# Patient Record
Sex: FEMALE | Race: OTHER | HISPANIC OR LATINO
[De-identification: names, ages, dates, MRNs, and addresses within clinical notes are randomized per-mention and may not be internally consistent; named-entity substitution may affect disease eponyms.]

---

## 2021-04-22 PROBLEM — Z00.00 ENCOUNTER FOR PREVENTIVE HEALTH EXAMINATION: Status: ACTIVE | Noted: 2021-04-22

## 2021-04-23 ENCOUNTER — NON-APPOINTMENT (OUTPATIENT)
Age: 36
End: 2021-04-23

## 2021-04-23 ENCOUNTER — APPOINTMENT (OUTPATIENT)
Dept: ORTHOPEDIC SURGERY | Facility: CLINIC | Age: 36
End: 2021-04-23
Payer: COMMERCIAL

## 2021-04-23 DIAGNOSIS — M54.12 RADICULOPATHY, CERVICAL REGION: ICD-10-CM

## 2021-04-23 DIAGNOSIS — Z78.9 OTHER SPECIFIED HEALTH STATUS: ICD-10-CM

## 2021-04-23 DIAGNOSIS — Z83.438 FAMILY HISTORY OF OTHER DISORDER OF LIPOPROTEIN METABOLISM AND OTHER LIPIDEMIA: ICD-10-CM

## 2021-04-23 DIAGNOSIS — Z82.49 FAMILY HISTORY OF ISCHEMIC HEART DISEASE AND OTHER DISEASES OF THE CIRCULATORY SYSTEM: ICD-10-CM

## 2021-04-23 PROCEDURE — 99072 ADDL SUPL MATRL&STAF TM PHE: CPT

## 2021-04-23 PROCEDURE — 72100 X-RAY EXAM L-S SPINE 2/3 VWS: CPT

## 2021-04-23 PROCEDURE — 72040 X-RAY EXAM NECK SPINE 2-3 VW: CPT

## 2021-04-23 PROCEDURE — 99204 OFFICE O/P NEW MOD 45 MIN: CPT
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
## 2021-04-23 NOTE — PHYSICAL EXAM
[de-identified] : Constitutional: Well-nourished, well-developed, No acute distress\par Respiratory:  Good respiratory effort, no SOB\par Lymphatic: No regional lymphadenopathy, no lymphedema\par Psychiatric: Pleasant and normal affect, alert and oriented x3\par Musculoskeletal: normal except where as noted in regional exam\par \par Cervical Spine Exam\par APPEARANCE: no marked deformities or malalignment, normal curvature, good posture\par POSITIVE TENDERNESS: + Bilateral upper trapezius, levator scapula, rhomboid major, and rhomboid minor, + spasm noted in the same muscles.\par NONTENDER: no bony midline tenderness.\par ROM: limited in all planes, most notably in flexion and sidebending due to pain\par RESISTIVE TESTING: painful 4/5 resisted ext, bilateral sidebending, rotation and shoulder shrug , 5/5 flexion \par SPECIAL TESTS: neg Spurling's b/l\par Vasc: 2+ radial pulse b/l\par Neuro: C5 - T1 intact to motor, DTRs 2+/4 biceps, triceps, brachioradialis\par Sensation: Intact to light touch throughout b/l UE\par \par Thoracic spine:  normal curvature and normal alignment. good posture. no midline bony tenderness, no marked spasm. no marked tenderness in paraspinal muscles.  ROM full & painless all planes\par \par Lumbar Spine Exam\par \par APPEARANCE: no marked deformities or malalignment, normal curvature of the lumbosacral spine. good posture\par POSITIVE TENDERNESS: + Bilateral lumbar tenderness and spasm noted in erector spinae and quadratus lumborum\par NONTENDER: no bony midline tenderness\par ROM: full, although painful at end range of flexion and extension\par RESISTIVE TESTING: painless 5/5 resisted flex/ext, sidebending b/l, and rotation\par SPECIAL TESTS: neg SLR b/l, neg DES b/l, neg Trendelenburg b/l \par PULSES: 2+ DP/PT pulses\par NEURO:  L1 - S2 intact to sensation and motor, DTRs 2+/4 patella and achilles\par  [de-identified] : The following radiographs were ordered and read by me during this patient's visit. I reviewed each radiograph in detail with the patient and discussed the findings as highlighted below. \par \par 2 views of the cervical spine were obtained today that show degenerative changes and evidence of mild osteoarthritis in the C5-C6 level.  No fracture, or dislocation seen at this time. There is no significant malalignment, however there is straightening of normal curvature. No other obvious osseous abnormality. Otherwise unremarkable.\par \par 2 views of the lumbar spine were obtained today that show no fracture, or dislocation. There are no degenerative changes seen. There is no malalignment. No obvious osseous abnormality. Otherwise unremarkable.

## 2022-01-06 ENCOUNTER — NON-APPOINTMENT (OUTPATIENT)
Age: 37
End: 2022-01-06

## 2022-01-21 ENCOUNTER — APPOINTMENT (OUTPATIENT)
Dept: ORTHOPEDIC SURGERY | Facility: CLINIC | Age: 37
End: 2022-01-21
Payer: COMMERCIAL

## 2022-01-21 VITALS
WEIGHT: 150 LBS | SYSTOLIC BLOOD PRESSURE: 110 MMHG | HEART RATE: 76 BPM | HEIGHT: 62 IN | BODY MASS INDEX: 27.6 KG/M2 | DIASTOLIC BLOOD PRESSURE: 72 MMHG

## 2022-01-21 DIAGNOSIS — S82.64XA NONDISPLACED FRACTURE OF LATERAL MALLEOLUS OF RIGHT FIBULA, INITIAL ENCOUNTER FOR CLOSED FRACTURE: ICD-10-CM

## 2022-01-21 DIAGNOSIS — M25.571 PAIN IN RIGHT ANKLE AND JOINTS OF RIGHT FOOT: ICD-10-CM

## 2022-01-21 PROCEDURE — 73610 X-RAY EXAM OF ANKLE: CPT | Mod: RT

## 2022-01-21 PROCEDURE — 99214 OFFICE O/P EST MOD 30 MIN: CPT

## 2022-01-21 NOTE — HISTORY OF PRESENT ILLNESS
[de-identified] : Patient is here for LBP follow up. She is here to review MRI results. Her symptoms have been stable since last evaluation. There was no injury to her back.\par She also presents with right ankle pain that began 2 days ago after rolling her ankle. She states that she feels like it happened because she cannot feel her foot. She has used Meloxicam and Motrin to treat it.

## 2022-01-21 NOTE — PHYSICAL EXAM
[de-identified] : Constitutional: Well-nourished, well-developed, No acute distress\par Respiratory:  Good respiratory effort, no SOB\par Lymphatic: No regional lymphadenopathy, no lymphedema\par Psychiatric: Pleasant and normal affect, alert and oriented x3\par Musculoskeletal: normal except where as noted in regional exam\par \par Lumbar Spine Exam\par APPEARANCE: no marked deformities or malalignment, normal curvature of the lumbosacral spine. good posture\par POSITIVE TENDERNESS: + Bilateral lumbar tenderness and spasm noted in erector spinae and quadratus lumborum\par NONTENDER: no bony midline tenderness\par ROM: full, although painful at end range of flexion and extension\par \par Right Ankle:\par APPEARANCE: + Lateral ankle swelling particularly overlying the lateral malleolus, no marked deformities or malalignment\par POSITIVE TENDERNESS: + sig TTP of lateral malleolus, mild at ATFL and CFL\par NONTENDER: medial malleolus, tibialis posterior tendon, achilles tendon, no marked thickening of tendon, PTFL, anterior tibiofibular ligament (high ankle), sinus tarsus, deltoid ligaments, 5th metatarsal. \par RANGE OF MOTION: Limited in all directions due to pain. \par RESISTIVE TESTING: deferred due to known fracture. \par SPECIAL TESTS: + tib/fib squeeze test, + calcaneal bump test\par NEURO: Normal sensation of LE\par PULSES: 2+ DP/PT pulses\par \par \par  [de-identified] : \par The following radiographs were ordered and read by me during this patient's visit. I reviewed each radiograph in detail with the patient and discussed the findings as highlighted below. \par \par 3 views of the right ankle were obtained today that show a questionable thin lucency running transversely in the distal fibula approximately 1-1/2 cm proximal to the lateral malleolus, questionable for nondisplaced transverse fracture.  There is no malalignment. There is no joint dislocation, or degenerative changes seen. No other obvious osseous abnormality. Otherwise unremarkable.\par \par \par _________________\par I reviewed, interpreted and clinically correlated the following outside imaging studies,\par  DATE OF SERVICE: 01/13/2022\par \par MAGNETIC RESONANCE IMAGING OF THE LUMBAR SPINE\par \par TECHNIQUE: Multiplanar, multisequential MRI was performed in the neutral\par sitting position.\par \par HISTORY: The patient complains of back pain.\par \par INTERPRETATION: There is slight curvature of the lumbar spine convex to the\par right at the upper lumbar levels and to the left at the mid to lower lumbar\par levels.\par \par At L4/5, right paracentral extruded disc herniation with inferiorly migrated\par disc fragment extending 1.5 cm below the level of the disc space impinging\par upon the ventral lateral thecal sac and the right L5 nerve root in the lateral\par recess.\par \par At L5/S1, posterior annular disc bulge deforms the ventral epidural space.\par Facet hypertrophic changes L4/5 and L5/S1 and there is disc hydration loss.\par \par Examination otherwise demonstrates the remaining lumbar vertebral bodies and\par intervertebral discs to be unremarkable in height and signal. The conus\par medullaris is unremarkable in signal, morphology and position. No focal\par prevertebral or posterior paraspinal abnormal masses or altered signals are\par otherwise noted.\par \par IMPRESSION:\par \par * L4/5, right paracentral extruded disc herniation with inferiorly migrated\par disc fragment extending 1.5 cm below the level of the disc space impinging\par upon the ventral lateral thecal sac and the right L5 nerve root in the lateral\par recess.\par \par * L5/S1, posterior annular disc bulge deforms the ventral epidural space.\par Facet hypertrophic changes L4/5 and L5/S1.\par \par * Slight curvature of the lumbar spine convex to the right at the upper lumbar\par levels and to the left at the mid to lower lumbar levels.

## 2022-01-21 NOTE — DISCUSSION/SUMMARY
[de-identified] : Patient was seen today for reevaluation and continued management of chronic low back pain with right lower extremity radiculopathy and sensory deficits with slight motor weakness.  We reviewed the patient's MRI results which shows she has a significant disc extrusion/sequestration with disc material impinging upon the descending L5 nerve root.  Based on the chronicity of the patient's pain, and her clinical exam as well as her MRI findings I recommend orthopedic spine consultation at this time to discuss risk/benefits of surgical intervention.  Patient will be given a timely consultation with one of my orthopedic Associates.\par Patient was also seen today for evaluation and management of acute right ankle pain, she sustained a trip and fall and appears to have a nondisplaced distal fibula fracture.  Patient is advised that the fracture is a very faint lucency seen on x-ray today, and may just be an incidental finding, but she at least has a significant ankle sprain.  At this time would recommend immobilization in a tall cam walker boot.  Patient was advised on the importance of elevating her foot and ankle above the level of her heart in order to address her swelling.  Patient has oral NSAIDs at home, recommend ibuprofen 800 mg p.o. twice daily consistently x1 week, then as needed thereafter.  Patient is advised that this should not prevent her in any significant way from undergoing lumbar spine surgery if needed.  Recommend follow-up in 1-2 weeks for repeat x-ray and reevaluation to help ascertain if patient has sustained a distal fibula fracture or a significant ankle sprain.  \par Patient and spouse appreciate and agree with current plan.\par \par I work as part of an academic orthopedic group and routinely have a physician in training (resident / fellow) working with me.  Any part of the history and physical exam performed by the physician in training was either directly reviewed and/or replicated by myself.  Any procedure performed by the physician in training was performed under my direct supervision and with the consent of the patient.\par \par This note was generated using dragon medical dictation software.  A reasonable effort has been made for proofreading its contents, but typos may still remain.  If there are any questions or points of clarification needed please notify my office.

## 2022-01-24 ENCOUNTER — APPOINTMENT (OUTPATIENT)
Dept: ORTHOPEDIC SURGERY | Facility: CLINIC | Age: 37
End: 2022-01-24
Payer: COMMERCIAL

## 2022-01-24 VITALS
WEIGHT: 150 LBS | HEART RATE: 71 BPM | HEIGHT: 62 IN | SYSTOLIC BLOOD PRESSURE: 101 MMHG | DIASTOLIC BLOOD PRESSURE: 66 MMHG | BODY MASS INDEX: 27.6 KG/M2

## 2022-01-24 PROCEDURE — 99214 OFFICE O/P EST MOD 30 MIN: CPT

## 2022-02-07 ENCOUNTER — APPOINTMENT (OUTPATIENT)
Dept: ORTHOPEDIC SURGERY | Facility: CLINIC | Age: 37
End: 2022-02-07
Payer: COMMERCIAL

## 2022-02-07 VITALS
HEART RATE: 69 BPM | BODY MASS INDEX: 27.6 KG/M2 | WEIGHT: 150 LBS | SYSTOLIC BLOOD PRESSURE: 109 MMHG | HEIGHT: 62 IN | DIASTOLIC BLOOD PRESSURE: 73 MMHG

## 2022-02-07 DIAGNOSIS — M54.16 RADICULOPATHY, LUMBAR REGION: ICD-10-CM

## 2022-02-07 DIAGNOSIS — M51.26 OTHER INTERVERTEBRAL DISC DISPLACEMENT, LUMBAR REGION: ICD-10-CM

## 2022-02-07 PROCEDURE — 99214 OFFICE O/P EST MOD 30 MIN: CPT

## 2024-03-19 ENCOUNTER — APPOINTMENT (OUTPATIENT)
Dept: NEUROSURGERY | Facility: CLINIC | Age: 39
End: 2024-03-19
Payer: COMMERCIAL

## 2024-03-19 ENCOUNTER — NON-APPOINTMENT (OUTPATIENT)
Age: 39
End: 2024-03-19

## 2024-03-19 VITALS
DIASTOLIC BLOOD PRESSURE: 78 MMHG | HEIGHT: 63 IN | HEART RATE: 83 BPM | OXYGEN SATURATION: 97 % | SYSTOLIC BLOOD PRESSURE: 120 MMHG | WEIGHT: 172 LBS | BODY MASS INDEX: 30.48 KG/M2

## 2024-03-19 PROCEDURE — 99203 OFFICE O/P NEW LOW 30 MIN: CPT

## 2024-03-19 RX ORDER — DICLOFENAC SODIUM 75 MG/1
75 TABLET, DELAYED RELEASE ORAL
Qty: 30 | Refills: 1 | Status: DISCONTINUED | COMMUNITY
Start: 2022-02-16 | End: 2024-03-19

## 2024-03-19 RX ORDER — METHYLPREDNISOLONE 4 MG/1
4 TABLET ORAL
Qty: 2 | Refills: 1 | Status: DISCONTINUED | COMMUNITY
Start: 2022-02-07 | End: 2024-03-19

## 2024-03-19 RX ORDER — METHYLPREDNISOLONE 4 MG/1
4 TABLET ORAL
Qty: 2 | Refills: 1 | Status: DISCONTINUED | COMMUNITY
Start: 2022-01-24 | End: 2024-03-19

## 2024-03-19 RX ORDER — PREDNISONE 5 MG/1
5 TABLET ORAL
Qty: 30 | Refills: 0 | Status: DISCONTINUED | COMMUNITY
Start: 2021-04-23 | End: 2024-03-19

## 2024-03-19 RX ORDER — ACETAZOLAMIDE 500 MG/1
500 CAPSULE ORAL
Refills: 0 | Status: ACTIVE | COMMUNITY

## 2024-03-19 NOTE — PHYSICAL EXAM
[General Appearance - Alert] : alert [General Appearance - In No Acute Distress] : in no acute distress [Oriented To Time, Place, And Person] : oriented to person, place, and time [General Appearance - Well-Appearing] : healthy appearing [Place] : oriented to place [Person] : oriented to person [Time] : oriented to time [Motor Tone] : muscle tone was normal in all four extremities [Intact] : all motor groups within normal limits of strength and tone bilaterally [Sclera] : the sclera and conjunctiva were normal [] : no respiratory distress [Outer Ear] : the ears and nose were normal in appearance [Neck Appearance] : the appearance of the neck was normal [Skin Color & Pigmentation] : normal skin color and pigmentation [Abnormal Walk] : normal gait

## 2024-03-21 ENCOUNTER — OUTPATIENT (OUTPATIENT)
Dept: OUTPATIENT SERVICES | Facility: HOSPITAL | Age: 39
LOS: 1 days | End: 2024-03-21
Payer: COMMERCIAL

## 2024-03-21 VITALS
DIASTOLIC BLOOD PRESSURE: 80 MMHG | WEIGHT: 173.94 LBS | HEIGHT: 63 IN | RESPIRATION RATE: 16 BRPM | HEART RATE: 63 BPM | OXYGEN SATURATION: 99 % | SYSTOLIC BLOOD PRESSURE: 114 MMHG | TEMPERATURE: 98 F

## 2024-03-21 DIAGNOSIS — G93.2 BENIGN INTRACRANIAL HYPERTENSION: ICD-10-CM

## 2024-03-21 DIAGNOSIS — Z01.818 ENCOUNTER FOR OTHER PREPROCEDURAL EXAMINATION: ICD-10-CM

## 2024-03-21 LAB
ANION GAP SERPL CALC-SCNC: 11 MMOL/L — SIGNIFICANT CHANGE UP (ref 5–17)
APTT BLD: 30 SEC — SIGNIFICANT CHANGE UP (ref 24.5–35.6)
BLD GP AB SCN SERPL QL: NEGATIVE — SIGNIFICANT CHANGE UP
BUN SERPL-MCNC: 8 MG/DL — SIGNIFICANT CHANGE UP (ref 7–23)
CALCIUM SERPL-MCNC: 9.7 MG/DL — SIGNIFICANT CHANGE UP (ref 8.4–10.5)
CHLORIDE SERPL-SCNC: 109 MMOL/L — HIGH (ref 96–108)
CO2 SERPL-SCNC: 18 MMOL/L — LOW (ref 22–31)
CREAT SERPL-MCNC: 0.85 MG/DL — SIGNIFICANT CHANGE UP (ref 0.5–1.3)
EGFR: 90 ML/MIN/1.73M2 — SIGNIFICANT CHANGE UP
GLUCOSE SERPL-MCNC: 79 MG/DL — SIGNIFICANT CHANGE UP (ref 70–99)
HCT VFR BLD CALC: 37 % — SIGNIFICANT CHANGE UP (ref 34.5–45)
HGB BLD-MCNC: 11.7 G/DL — SIGNIFICANT CHANGE UP (ref 11.5–15.5)
INR BLD: 1.06 RATIO — SIGNIFICANT CHANGE UP (ref 0.85–1.18)
MCHC RBC-ENTMCNC: 27.5 PG — SIGNIFICANT CHANGE UP (ref 27–34)
MCHC RBC-ENTMCNC: 31.6 GM/DL — LOW (ref 32–36)
MCV RBC AUTO: 86.9 FL — SIGNIFICANT CHANGE UP (ref 80–100)
NRBC # BLD: 0 /100 WBCS — SIGNIFICANT CHANGE UP (ref 0–0)
PLATELET # BLD AUTO: 426 K/UL — HIGH (ref 150–400)
POTASSIUM SERPL-MCNC: 3.9 MMOL/L — SIGNIFICANT CHANGE UP (ref 3.5–5.3)
POTASSIUM SERPL-SCNC: 3.9 MMOL/L — SIGNIFICANT CHANGE UP (ref 3.5–5.3)
PROTHROM AB SERPL-ACNC: 11.6 SEC — SIGNIFICANT CHANGE UP (ref 9.5–13)
RBC # BLD: 4.26 M/UL — SIGNIFICANT CHANGE UP (ref 3.8–5.2)
RBC # FLD: 15.3 % — HIGH (ref 10.3–14.5)
RH IG SCN BLD-IMP: POSITIVE — SIGNIFICANT CHANGE UP
SODIUM SERPL-SCNC: 138 MMOL/L — SIGNIFICANT CHANGE UP (ref 135–145)
WBC # BLD: 8.39 K/UL — SIGNIFICANT CHANGE UP (ref 3.8–10.5)
WBC # FLD AUTO: 8.39 K/UL — SIGNIFICANT CHANGE UP (ref 3.8–10.5)

## 2024-03-21 PROCEDURE — 86900 BLOOD TYPING SEROLOGIC ABO: CPT

## 2024-03-21 PROCEDURE — 86901 BLOOD TYPING SEROLOGIC RH(D): CPT

## 2024-03-21 PROCEDURE — 85730 THROMBOPLASTIN TIME PARTIAL: CPT

## 2024-03-21 PROCEDURE — 80048 BASIC METABOLIC PNL TOTAL CA: CPT

## 2024-03-21 PROCEDURE — G0463: CPT

## 2024-03-21 PROCEDURE — 85610 PROTHROMBIN TIME: CPT

## 2024-03-21 PROCEDURE — 85027 COMPLETE CBC AUTOMATED: CPT

## 2024-03-21 PROCEDURE — 86850 RBC ANTIBODY SCREEN: CPT
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
## 2024-03-21 NOTE — H&P PST ADULT - ASSESSMENT
DASI score: 8.2 METS  DASI activity: Brisk walking, moderate/heavy activity   Loose teeth or denture: denies   Mallampati: Class 1
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
## 2024-03-21 NOTE — H&P PST ADULT - PROBLEM SELECTOR PLAN 1
Scheduled for Lumbar Puncture/Catheter Venogram   Pre-procedure labs collected. Surgical soap given to patient. PST instructions provided. Patient verbalized understanding of instructions.

## 2024-03-22 NOTE — ASSESSMENT
[FreeTextEntry1] : Impression: IIH with Papilledema Unpleasant Side Effects of Diamox Headaches Improved on Diamox Bilateral Pulsatile Tinnitus, Intermittent, Improved on Diamox  MRV Head 3/2024 reveals bilateral venous sinus stenosis; RIGHT dominant; empty sella; dilated meckels cave; radiographic evidence of papilledema; dilated optic nerve sheaths  Ms. RAI FALL suffers from IIH and she has the classic appearance of lateral venous sinus stenosis on imaging.  I recommend lumbar puncture to confirm elevated intracranial pressure and catheter venogram to further assess her venous sinus stenosis.    The risks, benefits and alternatives of catheter venogram were discussed with the patient in detail. In my personal experience, the risks are very rare, but the possibility is not zero. Risks include stroke, brain hemorrhage, any type of disability (facial or extremity weakness, facial or extremity numbness, speech difficulties, blindness) and others. There are also possible complications related to the incisions such as infection, pain, swelling and bleeding.  Plan: Schedule Lumbar Puncture and Catheter Venogram Wean Diamox Prior to Procedure

## 2024-03-22 NOTE — HISTORY OF PRESENT ILLNESS
[de-identified] : Ms. FALL is a pleasant 38 year old female who presents today with a chief complaint of idiopathic intracranial hypertension.  A couple of months ago she developed pulsatile tinnitus, which she assumed was high blood pressure and she did not seek much treatment.  About a month ago she noticed some "shadows in her eye" on her way to vacation.  A week or two later she developed pressure behind her left eye and light sensitivity.  She went to her ophthalmologist who found papilledema and sent her to Dr. Carranza who confirmed the finding.   She has never had an LP.  Currently: Meds - Diamox 1000mg daily x 1 week; side effects of no appetite, worsening nausea, tingling in the hands/face Pulsatile Tinnitus - Bilateral ear, intermittent, Improved with Diamox Headaches - There was a time she was waking up with headaches each morning, now improved on Diamox Vision - Pressure in her left eye, shadow in peripheral vision

## 2024-03-25 ENCOUNTER — TRANSCRIPTION ENCOUNTER (OUTPATIENT)
Age: 39
End: 2024-03-25

## 2024-03-25 ENCOUNTER — APPOINTMENT (OUTPATIENT)
Dept: NEUROSURGERY | Facility: HOSPITAL | Age: 39
End: 2024-03-25

## 2024-03-25 ENCOUNTER — OUTPATIENT (OUTPATIENT)
Dept: OUTPATIENT SERVICES | Facility: HOSPITAL | Age: 39
LOS: 1 days | End: 2024-03-25
Payer: COMMERCIAL

## 2024-03-25 ENCOUNTER — RESULT REVIEW (OUTPATIENT)
Age: 39
End: 2024-03-25

## 2024-03-25 VITALS
HEART RATE: 72 BPM | TEMPERATURE: 98 F | WEIGHT: 173.94 LBS | OXYGEN SATURATION: 100 % | DIASTOLIC BLOOD PRESSURE: 78 MMHG | RESPIRATION RATE: 17 BRPM | SYSTOLIC BLOOD PRESSURE: 112 MMHG | HEIGHT: 63 IN

## 2024-03-25 VITALS
OXYGEN SATURATION: 100 % | RESPIRATION RATE: 17 BRPM | DIASTOLIC BLOOD PRESSURE: 70 MMHG | SYSTOLIC BLOOD PRESSURE: 97 MMHG | HEART RATE: 70 BPM

## 2024-03-25 DIAGNOSIS — I67.6 NONPYOGENIC THROMBOSIS OF INTRACRANIAL VENOUS SYSTEM: ICD-10-CM

## 2024-03-25 DIAGNOSIS — G93.2 BENIGN INTRACRANIAL HYPERTENSION: ICD-10-CM

## 2024-03-25 LAB
APPEARANCE CSF: CLEAR — SIGNIFICANT CHANGE UP
APPEARANCE SPUN FLD: COLORLESS — SIGNIFICANT CHANGE UP
COLOR CSF: SIGNIFICANT CHANGE UP
GLUCOSE CSF-MCNC: 57 MG/DL — SIGNIFICANT CHANGE UP (ref 40–70)
LYMPHOCYTES # CSF: 36 % — LOW (ref 40–80)
MONOS+MACROS NFR CSF: 62 % — HIGH (ref 15–45)
NEUTROPHILS # CSF: 2 % — SIGNIFICANT CHANGE UP (ref 0–6)
NRBC NFR CSF: 1 /UL — SIGNIFICANT CHANGE UP (ref 0–5)
PROT CSF-MCNC: 34 MG/DL — SIGNIFICANT CHANGE UP (ref 15–45)
RBC # CSF: 22 /UL — HIGH (ref 0–0)
TUBE TYPE: SIGNIFICANT CHANGE UP

## 2024-03-25 PROCEDURE — C1887: CPT

## 2024-03-25 PROCEDURE — 82945 GLUCOSE OTHER FLUID: CPT

## 2024-03-25 PROCEDURE — 84157 ASSAY OF PROTEIN OTHER: CPT

## 2024-03-25 PROCEDURE — C1760: CPT

## 2024-03-25 PROCEDURE — C1769: CPT

## 2024-03-25 PROCEDURE — 37253 INTRVASC US NONCORONARY ADDL: CPT | Mod: 59

## 2024-03-25 PROCEDURE — C1894: CPT

## 2024-03-25 PROCEDURE — 62328 DX LMBR SPI PNXR W/FLUOR/CT: CPT

## 2024-03-25 PROCEDURE — 36012 PLACE CATHETER IN VEIN: CPT

## 2024-03-25 PROCEDURE — 37252 INTRVASC US NONCORONARY 1ST: CPT

## 2024-03-25 PROCEDURE — 75870 VEIN X-RAY SKULL: CPT

## 2024-03-25 PROCEDURE — C1753: CPT

## 2024-03-25 PROCEDURE — 89051 BODY FLUID CELL COUNT: CPT

## 2024-03-25 RX ORDER — SODIUM CHLORIDE 9 MG/ML
1000 INJECTION, SOLUTION INTRAVENOUS
Refills: 0 | Status: DISCONTINUED | OUTPATIENT
Start: 2024-03-25 | End: 2024-04-08

## 2024-03-25 RX ORDER — SODIUM CHLORIDE 9 MG/ML
1000 INJECTION INTRAMUSCULAR; INTRAVENOUS; SUBCUTANEOUS
Refills: 0 | Status: DISCONTINUED | OUTPATIENT
Start: 2024-03-25 | End: 2024-04-08
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
## 2024-03-25 NOTE — ASU DISCHARGE PLAN (ADULT/PEDIATRIC) - ASU DC SPECIAL INSTRUCTIONSFT
Please no heavy lifting             no bending             no twisting             no picking up anything over 10 pounds

## 2024-03-25 NOTE — CHART NOTE - NSCHARTNOTEFT_GEN_A_CORE
Interventional Neuro- Radiology   Procedure Note PA-KENNEY    Procedure: Lumbar puncture   Pre- Procedure Diagnosis:  Post- Procedure Diagnosis:    : Dr Sharonda Marsh  Fellow:   Physician Assistant: Doris Tom PA-C    Nurse:  Radiologic Tech:  Anesthesiologist:  Needle:        I/Os: EBL less than 10cc  IV fluids:     cc  Urine due to void    Contrast Visi             Vitals: BP         HR      Spo2     %          Preliminary Report: Interventional Neuro- Radiology   Procedure Note PA-KENNEY    Procedure: Lumbar puncture   Pre- Procedure Diagnosis:  Post- Procedure Diagnosis:    : Dr Sharonda Marsh  Fellow:   Physician Assistant: Doris Tom PA-C    Nurse:  Radiologic Tech:  Anesthesiologist:  Needle:        I/Os: EBL less than 10cc  IV fluids:     cc  Urine due to void    Contrast Visi             Vitals: BP         HR      Spo2     %          Preliminary Report: patient was place in the left lateral decubitus position, patient was prepped and draped in the usual sterile fashion. Interventional Neuro- Radiology   Procedure Note PA-KENNEY    Procedure: Lumbar puncture   Pre- Procedure Diagnosis: idiopathic intracranial hypertension   Post- Procedure Diagnosis:    : Dr Sharonda Marsh  Fellow:    Dr Yemi Contreras   Physician Assistant: Doris Tom PA-C    Nurse:                   Kris Lopez RN  Radiologic Tech:   David Pedroza LRT  Anesthesiologist:  Dr Lalo Harris   Needle:                22 gauge 5 inch      I/Os: EBL less than 10cc  Opening pressure 38      Vitals: /70        HR72      Spo2 100%          Preliminary Report: Patient was place in the left lateral decubitus position, patient was prepped and draped in the usual sterile fashion. Lidocaine was injected, and a spinal needle was advance using fluoroscopy, in the L3, L4 disc space. Opening pressure was 38. Specimens were sent. patient was placed supine and transported to IR recovery room. Interventional Neuro- Radiology   Procedure Note PA-KENNEY    Procedure: Lumbar puncture   Pre- Procedure Diagnosis: idiopathic intracranial hypertension   Post- Procedure Diagnosis:    : Dr Sharonda Marsh  Fellow:    Dr Yemi Contreras   Physician Assistant: Doris Tom PA-C    Nurse:                   Kris Lopez RN  Radiologic Tech:   David Pedroza LRT  Anesthesiologist:  Dr Lalo Harris   Needle:                 22 gauge 5 inch      I/Os: EBL less than 10cc  Opening pressure 38      Vitals: /70        HR72      Spo2 100%          Preliminary Report: Patient was place in the left lateral decubitus position, patient was prepped and draped in the usual sterile fashion. Lidocaine was injected, and a spinal needle was advance using fluoroscopy, in the L3, L4 disc space. Opening pressure was 38. Specimens were sent. patient was placed supine and transported to IR recovery room. Interventional Neuro- Radiology   Procedure Note PA-KENNEY    Procedure: Lumbar puncture   Pre- Procedure Diagnosis: idiopathic intracranial hypertension   Post- Procedure Diagnosis:    : Dr Sharonda Marsh  Fellow:    Dr Yemi Contreras   Physician Assistant: Doris Tom PA-C    Nurse:                   Kris Lopez RN  Radiologic Tech:   David Pedroza LRT  Anesthesiologist:  Dr Lalo Harris   Needle:                 22 gauge 5 inch      I/Os: EBL less than 10cc  Opening pressure 38cm CSF      Vitals: /70        HR72      Spo2 100%          Preliminary Report: Patient was place in the left lateral decubitus position, patient was prepped and draped in the usual sterile fashion. Lidocaine was administered to the skin and then subcutaneous tissue and a spinal needle was advance under fluoroscopy, in the L3, L4 intervertebral space. There was reflux of clear CSF. Opening pressure was 38cm CSF. Specimens collected and were walked to the lab. Patient was placed supine and transported to IR recovery room, for 2 hours.

## 2024-03-25 NOTE — ASU DISCHARGE PLAN (ADULT/PEDIATRIC) - NS MD DC FALL RISK RISK
For information on Fall & Injury Prevention, visit: [email or web address] OR  [email or web address] OR  https://www.cdc.gov/steadi/patient.html

## 2024-03-25 NOTE — CHART NOTE - NSCHARTNOTEFT_GEN_A_CORE
Interventional Neuro- Radiology   Procedure Note ILA    Procedure: Catheter Cerebral Venogram   Pre- Procedure Diagnosis:  Post- Procedure Diagnosis:    : Dr Sharonda Marsh  Fellow:   Physician Assistant: Doris Tom PA-C    Nurse:  Radiologic Tech:  Anesthesiologist:  Sheath:  Sheath:  Jacobu:      I/Os: EBL less than 10cc  IV fluids:     cc  Urine output     cc    Contrast Visi             Vitals: BP         HR      Spo2     %          Preliminary Report:  Using a 5 Hungarian short sheath to the right groin under MAC sedation via left vertebral artery, left internal carotid artery, left external carotid artery, right vertebral artery, right internal carotid artery, right external carotid artery a selective cerebral angiography was performed and demonstrated                       Official note to follow.  Patient tolerated procedure well, hemodynamically stable, no change in neurological status compared to baseline. Results discussed with neuro ICU team, patient and patient's family. Right groin sheath was removed, manual compression held to hemostasis for 20 minutes, no active bleeding, no hematoma, quick clot and safeguard balloon dressing applied at Interventional Neuro- Radiology   Procedure Note PA-C    Procedure: Catheter Cerebral Venogram and manometry    Pre- Procedure Diagnosis: Idiopathic intracranial hypertension   Post- Procedure Diagnosis:    : Dr Sharonda Marsh  Fellow:     Dr Aramis Contreras   Physician Assistant: Doris Tom PA-C    Nurse:                   Kris Lopez RN  Radiologic Tech:   David Pedroza LRT  Anesthesiologist:  Dr Lalo Alba   Sheath:                 5 Sri Lankan short sheath       I/Os: EBL less than 10cc  IV fluids: 400cc  Urine due to void  Contrast Visi   5cc      28 SSS  28 PT  28 DT  8   PS  7   DS  7   Jugular Bulb       Vitals: /70        HR 72     Spo2 100%          Preliminary Report:  Using a 5 [place]ankan short sheath to the right femoral vein under MAC sedation a catheter cerebral venogram and manometry was performed and demonstrated a gradient. Official note to follow.  Patient tolerated procedure well, hemodynamically stable, no change in neurological status compared to baseline. Results discussed with patient and patient's . Right groin sheath was removed, a vascade device and manual compression held to hemostasis for 20 minutes, no active bleeding, no hematoma, quick clot and safeguard balloon dressing applied at 1330 hours, by Dr Yemi Contreras.

## 2024-03-25 NOTE — ASU DISCHARGE PLAN (ADULT/PEDIATRIC) - CARE PROVIDER_API CALL
Sharonda Marsh  Radiology  [address], [address]  [place] [address]  Phone: [phone]  Fax: [phone]  Follow Up Time:

## 2024-03-25 NOTE — ASU PATIENT PROFILE, ADULT - PATIENT'S PREFERRED PRONOUN
Behavorial Health Tech      Signature:  Shiraz Salcedo Her/She

## 2024-03-25 NOTE — ASU DISCHARGE PLAN (ADULT/PEDIATRIC) - NURSING INSTRUCTIONS
Please feel free to contact us at [phone] if any problems arise. After 6PM, Monday through Friday, on weekends and on holidays, please call [phone] and ask for the radiology resident on call to be paged.

## 2024-04-05 DIAGNOSIS — G93.2 BENIGN INTRACRANIAL HYPERTENSION: ICD-10-CM

## 2024-04-05 DIAGNOSIS — H47.10 UNSPECIFIED PAPILLEDEMA: ICD-10-CM

## 2024-04-08 PROBLEM — M50.20 OTHER CERVICAL DISC DISPLACEMENT, UNSPECIFIED CERVICAL REGION: Chronic | Status: ACTIVE | Noted: 2024-03-21

## 2024-04-09 PROBLEM — M51.26 OTHER INTERVERTEBRAL DISC DISPLACEMENT, LUMBAR REGION: Chronic | Status: ACTIVE | Noted: 2024-03-21

## 2024-04-09 PROBLEM — H93.A9 PULSATILE TINNITUS, UNSPECIFIED EAR: Chronic | Status: ACTIVE | Noted: 2024-03-21

## 2024-04-09 PROBLEM — G93.2 BENIGN INTRACRANIAL HYPERTENSION: Chronic | Status: ACTIVE | Noted: 2024-03-21
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
## 2024-04-15 ENCOUNTER — APPOINTMENT (OUTPATIENT)
Dept: NEUROSURGERY | Facility: HOSPITAL | Age: 39
End: 2024-04-15

## 2024-04-15 ENCOUNTER — OUTPATIENT (OUTPATIENT)
Dept: INPATIENT UNIT | Facility: HOSPITAL | Age: 39
LOS: 1 days | End: 2024-04-15
Payer: COMMERCIAL

## 2024-04-15 ENCOUNTER — TRANSCRIPTION ENCOUNTER (OUTPATIENT)
Age: 39
End: 2024-04-15

## 2024-04-15 VITALS
TEMPERATURE: 99 F | WEIGHT: 171.08 LBS | DIASTOLIC BLOOD PRESSURE: 78 MMHG | RESPIRATION RATE: 20 BRPM | OXYGEN SATURATION: 100 % | SYSTOLIC BLOOD PRESSURE: 113 MMHG | HEIGHT: 63 IN | HEART RATE: 86 BPM

## 2024-04-15 VITALS
OXYGEN SATURATION: 99 % | SYSTOLIC BLOOD PRESSURE: 125 MMHG | RESPIRATION RATE: 15 BRPM | DIASTOLIC BLOOD PRESSURE: 67 MMHG | HEART RATE: 70 BPM

## 2024-04-15 DIAGNOSIS — G93.2 BENIGN INTRACRANIAL HYPERTENSION: ICD-10-CM

## 2024-04-15 PROCEDURE — 36223 PLACE CATH CAROTID/INOM ART: CPT

## 2024-04-15 PROCEDURE — 36226 PLACE CATH VERTEBRAL ART: CPT

## 2024-04-15 PROCEDURE — C1760: CPT

## 2024-04-15 PROCEDURE — 75894 X-RAYS TRANSCATH THERAPY: CPT

## 2024-04-15 PROCEDURE — C1757: CPT

## 2024-04-15 PROCEDURE — C9399: CPT

## 2024-04-15 PROCEDURE — C1894: CPT

## 2024-04-15 PROCEDURE — C1876: CPT

## 2024-04-15 PROCEDURE — 36012 PLACE CATHETER IN VEIN: CPT

## 2024-04-15 PROCEDURE — C1769: CPT

## 2024-04-15 PROCEDURE — 75898 FOLLOW-UP ANGIOGRAPHY: CPT | Mod: 59

## 2024-04-15 PROCEDURE — 61624 TCAT PERM OCCLS/EMBOLJ CNS: CPT

## 2024-04-15 PROCEDURE — C1887: CPT

## 2024-04-15 RX ORDER — ONDANSETRON 8 MG/1
4 TABLET, FILM COATED ORAL ONCE
Refills: 0 | Status: DISCONTINUED | OUTPATIENT
Start: 2024-04-15 | End: 2024-04-15

## 2024-04-15 RX ORDER — ACETAMINOPHEN 500 MG
1000 TABLET ORAL ONCE
Refills: 0 | Status: DISCONTINUED | OUTPATIENT
Start: 2024-04-15 | End: 2024-04-15

## 2024-04-15 RX ORDER — ACETAZOLAMIDE 250 MG/1
1 TABLET ORAL
Refills: 0 | DISCHARGE

## 2024-04-15 RX ORDER — INFLUENZA VIRUS VACCINE 15; 15; 15; 15 UG/.5ML; UG/.5ML; UG/.5ML; UG/.5ML
0.5 SUSPENSION INTRAMUSCULAR ONCE
Refills: 0 | Status: DISCONTINUED | OUTPATIENT
Start: 2024-04-15 | End: 2024-04-15

## 2024-04-15 RX ORDER — OXYCODONE AND ACETAMINOPHEN 5; 325 MG/1; MG/1
1 TABLET ORAL
Qty: 16 | Refills: 0
Start: 2024-04-15

## 2024-04-15 NOTE — ASU DISCHARGE PLAN (ADULT/PEDIATRIC) - ASU DC SPECIAL INSTRUCTIONSFT
If you take birth control pills they may not be effective for 14 days (2 weeks) from the procedure. So if you are sexually active and depend on birth control pills you must use another form of birth control (e.g. condoms) for 2 weeks after the procedure.    NO swimming or soaking in tub for 7 days. Do not use a hot tub or jacuzzi for 7 days. If you take birth control pills they may not be effective for 14 days (2 weeks) from the procedure. So if you are sexually active and depend on birth control pills you must use another form of birth control (e.g. condoms) for 2 weeks after the procedure.    NO swimming or soaking in tub for 7 days. Do not use a hot tub or jacuzzi for 7 days.    For Medrol dose pack please take all the pills for each day at one time with food in the morning. 6 tablets on day 1, 5 tablets on day 2, 4 tablets on day 3, 3 tablets on day 4, 2 tablets on day 5, and 1 tablet on day 6.

## 2024-04-15 NOTE — ASU DISCHARGE PLAN (ADULT/PEDIATRIC) - NURSING INSTRUCTIONS
Please feel free to contact us at [phone] if any problems arise. After 6PM, Monday through Friday, on weekends and on holidays, please call [phone] and ask for the radiology resident on call to be paged.

## 2024-04-15 NOTE — CHART NOTE - NSCHARTNOTEFT_GEN_A_CORE
Interventional Neuro- Radiology   Procedure Note      Procedure: Selective Cerebral Angiography, Venography, and Venous Sinus Stenting X 2    Pre- Procedure Diagnosis: IIH  Post- Procedure Diagnosis: IIH s/p venous sinus stenting X 2    : Dr. Sharonda Marsh  Fellow: Dr. Irving Meyer  Physician Assistant: Alex Brand PA-C  Resident: Dr. Dilia Sánchez    RN: Kris Lopez  Tech: Eric Ferrell    Anesthesia: (general anesthesia)     I/Os:  Fluids:  García:  Contrast:  Estimated Blood Loss: <10cc    Preliminary Report:  Under MAC/ general anesthesia, using a 5 Maori short/long sheath to the right/ left radial artery/ right femoral artery/ right femoral vein/ examination of left vertebral artery/ left internal carotid artery/ left external carotid artery/ right vertebral artery/ right internal carotid artery/ right external carotid artery via selective cerebral angiography demonstrates ________. ( Official note to follow).    Patient tolerated procedure well, vital signs stable, hemodynamically stable, no change in neurological status compared to baseline. Results discussed with neurosurgery/ patient and their family. Groin sheath d/c'ed, manual compression held to hemostasis, no active bleeding, no hematoma, Vascade/ Celt device applied, quick clot and safeguard balloon dressing applied at _____h.  Radial sheath d/c'ed radial quickclot dressing placed at ___h, no bleeding or hematoma. Patient transferred to PACU/ NSCU/ IR recovery for further care/ monitoring. Interventional Neuro- Radiology   Procedure Note      Procedure: Selective Cerebral Angiography, Venography, and Venous Sinus Stenting X 2    Pre- Procedure Diagnosis: IIH  Post- Procedure Diagnosis: IIH s/p venous sinus stenting X 2    : Dr. Sharonda Marsh  Fellow: Dr. Irving Meyer  Physician Assistant: Alex Brand PA-C  Resident: Dr. Dilia Sánchez    RN: Kris Lopez  Tech: Eric Ferrell    Anesthesia: (general anesthesia) Dr. Alex Esquivel    I/Os:  Fluids: 400 cc  García: DTV  Contrast: 63 cc  Estimated Blood Loss: <10cc    Preliminary Report:  Under general anesthesia, an 8 Gibraltarian short sheath was placed to the right femoral vein and a 5/4 Gibraltarian short sheath was placed to the right radial artery. Examination of left vertebral artery/ left internal carotid artery/ left external carotid artery/ right vertebral artery/ right internal carotid artery/ right external carotid artery via selective cerebral angiography was done. Two Stents were placed into the Right transverse and sigmoid sinus. Official dictated report to follow.    Patient tolerated procedure well, vital signs stable, hemodynamically stable, no change in neurological status compared to baseline. Results discussed with patient and their family. Groin sheath d/c'ed, manual compression held to hemostasis, no active bleeding, no hematoma, Vascade device applied, quick clot and safeguard balloon dressing applied at 14:00 hours. Radial sheath d/c'ed radial quickclot dressing placed at 14:00 hours. No bleeding or hematoma. Patient transferred to IR recovery for further care/ monitoring. You can access the FollowMyHealth Patient Portal offered by [facility] by registering at the following website: [email or web address]. By joining [employer]’s FollowMyHealth portal, you will also be able to view your health information using other applications (apps) compatible with our system.

## 2024-04-15 NOTE — PRE-ANESTHESIA EVALUATION ADULT - NSPROPOSEDPROCEDFT_GEN_ALL_CORE
Assessment/Plan:             1  Chronic obstructive pulmonary disease, unspecified COPD type ([address] )    2  Cerumen debris on tympanic membrane of right ear  Comments:  better    3  Impaired fasting blood sugar    4  Chronic combined systolic and diastolic congestive heart failure ([address] )    5  Dysthymic disorder           Subjective:      Patient ID: Kenneth Spring is a 67 y o  female  Follow-up on right ear check, using the earwax drops, helping her, could not afford Atrovent inhaler, shortness of bread is stable      The following portions of the patient's history were reviewed and updated as appropriate: She  has a past medical history of Allergic rhinitis, Anxiety, Arthritis, Diabetes mellitus ([address] ), Oxygen decrease, and Tobacco abuse    She   Patient Active Problem List    Diagnosis Date Noted    Cerumen debris on tympanic membrane of right ear 03/21/2022    Dysthymic disorder 01/24/2022    Impaired fasting blood sugar 01/24/2022    Gastrointestinal hemorrhage 10/21/2021    Dermatitis 10/21/2021    Primary osteoarthritis of both hands 10/21/2021    Congestive heart failure with right ventricular systolic dysfunction ([address] ) 09/23/2021    Rheumatoid factor positive 09/22/2021    Arthritis 09/22/2021    Screening for osteoporosis 06/30/2021    Hypoxia 06/16/2021    Essential hypertension, benign 06/02/2021    Chronic combined systolic and diastolic congestive heart failure ([address] ) 06/02/2021    Pulmonary hypertension ([address] ) 06/02/2021    Cigarette nicotine dependence without complication     Elevated BP without diagnosis of hypertension 05/22/2021    Lung nodules 05/18/2021    Chronic obstructive pulmonary disease ([address] ) 05/18/2021    Chronic respiratory failure with hypoxia (HCC) 05/18/2021    Nicotine dependence 05/18/2021    Microalbuminuria 05/17/2021    Uncontrolled type 2 diabetes mellitus with hyperglycemia (HCC) 05/11/2021    Allergic rhinitis     Type 2 diabetes, HbA1c goal < 7% ([address] )     Anxiety      She  has a past surgical history that includes Breast surgery (Right); Other surgical history; and Other surgical history  Her family history includes Diabetes in her brother; Heart disease in her brother; Hypotension in her daughter; Lymphoma in her brother; No Known Problems in her sister; Parkinsonism in her sister; Prostate cancer in her father; Proteinuria in her daughter  She  reports that she quit smoking about 10 months ago  Her smoking use included cigarettes  She has a 15 00 pack-year smoking history  She has never used smokeless tobacco  She reports current alcohol use of about 1 0 - 2 0 standard drink of alcohol per week  She reports that she does not use drugs  Current Outpatient Medications   Medication Sig Dispense Refill    furosemide (LASIX) 20 mg tablet Take 3 tablets (60 mg total) by mouth daily 270 tablet 1    hydrOXYzine HCL (ATARAX) 10 mg tablet Take 1 tablet (10 mg total) by mouth 2 (two) times a day as needed for itching 60 tablet 0    potassium chloride (K-DUR,KLOR-CON) 20 mEq tablet Take 1 tablet (20 mEq total) by mouth daily 30 tablet 8    sertraline (Zoloft) 25 mg tablet Take 1 tablet (25 mg total) by mouth daily 30 tablet 1    triamcinolone (KENALOG) 0 1 % cream Apply topically 2 (two) times a day 30 g 0    ipratropium (Atrovent HFA) 17 mcg/act inhaler Inhale 2 puffs 3 (three) times a day (Patient not taking: Reported on 3/21/2022 ) 12 9 g 2     No current facility-administered medications for this visit       Current Outpatient Medications on File Prior to Visit   Medication Sig    furosemide (LASIX) 20 mg tablet Take 3 tablets (60 mg total) by mouth daily    hydrOXYzine HCL (ATARAX) 10 mg tablet Take 1 tablet (10 mg total) by mouth 2 (two) times a day as needed for itching    potassium chloride (K-DUR,KLOR-CON) 20 mEq tablet Take 1 tablet (20 mEq total) by mouth daily    sertraline (Zoloft) 25 mg tablet Take 1 tablet (25 mg total) by mouth daily    triamcinolone (KENALOG) 0 1 % cream Apply topically 2 (two) times a day    ipratropium (Atrovent HFA) 17 mcg/act inhaler Inhale 2 puffs 3 (three) times a day (Patient not taking: Reported on 3/21/2022 )    [DISCONTINUED] fluticasone-umeclidinium-vilanterol (Trelegy Ellipta) 100-62 5-25 MCG/INH inhaler Inhale 1 puff daily Rinse mouth after use  (Patient not taking: Reported on 1/24/2022 )     No current facility-administered medications on file prior to visit  She has No Known Allergies       Review of Systems   Constitutional: Negative for chills and fever  HENT: Negative for congestion, ear pain and sore throat  Eyes: Negative for pain  Respiratory: Positive for shortness of breath  Negative for cough  Cardiovascular: Negative for chest pain and leg swelling  Gastrointestinal: Negative for abdominal pain, nausea and vomiting  Endocrine: Negative for polyuria  Genitourinary: Negative for difficulty urinating, frequency and urgency  Musculoskeletal: Negative for arthralgias and back pain  Skin: Negative for rash  Neurological: Negative for weakness and headaches  Psychiatric/Behavioral: Negative for sleep disturbance  The patient is not nervous/anxious            Objective:      /86 (BP Location: Right arm, Patient Position: Sitting, Cuff Size: Standard)   Pulse 90   Temp 98 6 °F (37 °C) (Axillary)   Ht 5' 4" (1 626 m)   Wt 69 4 kg (153 lb)   SpO2 92%   BMI 26 26 kg/m²     Recent Results (from the past 1344 hour(s))   Sjogren's Antibodies    Collection Time: 02/15/22  9:19 AM   Result Value Ref Range    SS-A (RO) Ab <0 2 0 0 - 0 9 AI    SS-B (LA) Ab <0 2 0 0 - 0 9 AI   CBC and differential    Collection Time: 02/15/22  9:19 AM   Result Value Ref Range    WBC 6 08 4 31 - 10 16 Thousand/uL    RBC 4 93 3 81 - 5 12 Million/uL    Hemoglobin 15 5 (H) 11 5 - 15 4 g/dL    Hematocrit 47 0 (H) 34 8 - 46 1 %    MCV 95 82 - 98 fL    MCH 31 4 26 8 - 34 3 pg    MCHC 33 0 31 4 - 37 4 g/dL RDW 12 8 11 6 - 15 1 %    MPV 11 7 8 9 - 12 7 fL    Platelets 588 173 - 913 Thousands/uL    nRBC 0 /100 WBCs    Neutrophils Relative 64 43 - 75 %    Immat GRANS % 0 0 - 2 %    Lymphocytes Relative 26 14 - 44 %    Monocytes Relative 7 4 - 12 %    Eosinophils Relative 2 0 - 6 %    Basophils Relative 1 0 - 1 %    Neutrophils Absolute 3 90 1 85 - 7 62 Thousands/µL    Immature Grans Absolute 0 01 0 00 - 0 20 Thousand/uL    Lymphocytes Absolute 1 55 0 60 - 4 47 Thousands/µL    Monocytes Absolute 0 44 0 17 - 1 22 Thousand/µL    Eosinophils Absolute 0 12 0 00 - 0 61 Thousand/µL    Basophils Absolute 0 06 0 00 - 0 10 Thousands/µL   Comprehensive metabolic panel    Collection Time: 02/15/22  9:19 AM   Result Value Ref Range    Sodium 134 (L) 136 - 145 mmol/L    Potassium 3 7 3 5 - 5 3 mmol/L    Chloride 99 (L) 100 - 108 mmol/L    CO2 26 21 - 32 mmol/L    ANION GAP 9 4 - 13 mmol/L    BUN 20 5 - 25 mg/dL    Creatinine 0 79 0 60 - 1 30 mg/dL    Glucose, Fasting 183 (H) 65 - 99 mg/dL    Calcium 9 5 8 3 - 10 1 mg/dL    AST 15 5 - 45 U/L    ALT 23 12 - 78 U/L    Alkaline Phosphatase 73 46 - 116 U/L    Total Protein 8 1 6 4 - 8 2 g/dL    Albumin 4 3 3 5 - 5 0 g/dL    Total Bilirubin 1 10 (H) 0 20 - 1 00 mg/dL    eGFR 71 ml/min/1 73sq m   Hemoglobin A1C    Collection Time: 02/15/22  9:19 AM   Result Value Ref Range    Hemoglobin A1C 6 3 (H) Normal 3 8-5 6%; PreDiabetic 5 7-6 4%; Diabetic >=6 5%; Glycemic control for adults with diabetes <7 0% %     mg/dl   Lipid Panel with Direct LDL reflex    Collection Time: 02/15/22  9:19 AM   Result Value Ref Range    Cholesterol 221 (H) See Comment mg/dL    Triglycerides 79 See Comment mg/dL    HDL, Direct 75 >=50 mg/dL    LDL Calculated 130 (H) 0 - 100 mg/dL   TSH, 3rd generation    Collection Time: 02/15/22  9:19 AM   Result Value Ref Range    TSH 3RD GENERATON 1 920 0 358 - 3 740 uIU/mL        Physical Exam  Constitutional:       Appearance: Normal appearance     HENT:      Head: Normocephalic  Right Ear: Tympanic membrane, ear canal and external ear normal       Left Ear: Tympanic membrane, ear canal and external ear normal       Nose: Nose normal  No congestion  Mouth/Throat:      Mouth: Mucous membranes are moist       Pharynx: Oropharynx is clear  No oropharyngeal exudate or posterior oropharyngeal erythema  Eyes:      Extraocular Movements: Extraocular movements intact  Conjunctiva/sclera: Conjunctivae normal       Pupils: Pupils are equal, round, and reactive to light  Cardiovascular:      Rate and Rhythm: Normal rate and regular rhythm  Heart sounds: Normal heart sounds  No murmur heard  Pulmonary:      Effort: Pulmonary effort is normal       Breath sounds: Normal breath sounds  No wheezing or rales  Abdominal:      General: Bowel sounds are normal  There is no distension  Palpations: Abdomen is soft  Tenderness: There is no abdominal tenderness  Musculoskeletal:         General: Normal range of motion  Cervical back: Normal range of motion and neck supple  Right lower leg: No edema  Left lower leg: No edema  Lymphadenopathy:      Cervical: No cervical adenopathy  Skin:     General: Skin is warm  Neurological:      General: No focal deficit present  Mental Status: She is alert and oriented to person, place, and time  venous sinus stent
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
## 2024-04-15 NOTE — ASU DISCHARGE PLAN (ADULT/PEDIATRIC) - CARE PROVIDER_API CALL
Sharonda Marsh  Radiology  [address], [address]  [place] [address]  Phone: [phone]  Fax: [phone]  Follow Up Time:

## 2024-04-16 RX ORDER — CLOPIDOGREL BISULFATE 75 MG/1
1 TABLET, FILM COATED ORAL
Refills: 0 | DISCHARGE

## 2024-04-16 RX ORDER — ASPIRIN/CALCIUM CARB/MAGNESIUM 324 MG
1 TABLET ORAL
Refills: 0 | DISCHARGE

## 2024-04-25 DIAGNOSIS — I67.6 NONPYOGENIC THROMBOSIS OF INTRACRANIAL VENOUS SYSTEM: ICD-10-CM

## 2024-05-17 ENCOUNTER — APPOINTMENT (OUTPATIENT)
Dept: NEUROSURGERY | Facility: CLINIC | Age: 39
End: 2024-05-17

## 2024-05-28 ENCOUNTER — APPOINTMENT (OUTPATIENT)
Dept: NEUROSURGERY | Facility: CLINIC | Age: 39
End: 2024-05-28
Payer: COMMERCIAL

## 2024-05-28 DIAGNOSIS — G93.2 BENIGN INTRACRANIAL HYPERTENSION: ICD-10-CM

## 2024-05-28 PROCEDURE — 99442: CPT

## 2024-05-28 RX ORDER — CLOPIDOGREL BISULFATE 75 MG/1
75 TABLET, FILM COATED ORAL
Qty: 40 | Refills: 0 | Status: DISCONTINUED | COMMUNITY
Start: 2024-04-08 | End: 2024-05-28

## 2024-05-28 RX ORDER — ASPIRIN 325 MG/325MG
325 CAPSULE ORAL
Qty: 90 | Refills: 3 | Status: ACTIVE | COMMUNITY
Start: 2024-05-28 | End: 1900-01-01

## 2024-05-28 RX ORDER — ASPIRIN 325 MG/1
325 TABLET, FILM COATED ORAL DAILY
Qty: 90 | Refills: 3 | Status: DISCONTINUED | COMMUNITY
Start: 2024-04-08 | End: 2024-05-28

## 2024-05-28 NOTE — HISTORY OF PRESENT ILLNESS
[FreeTextEntry1] : Ms. FALL is a pleasant 39yo female who presents today after successful venous sinus stenting on 4/15/24.  The patient went home the same day in good condition.  Since the procedure, she reports that her vision is much clearer.  She is having some dizziness with position changes.  She also have bilateral pulsatile tinnitus that is triggered by laying down on that side and noise.  She has IBS symptoms when she eats vegetables, which she believes is related to the ASA.  Her puncture sites are well healed.   She remains compliant with ASA 325mg daily.  She saw Dr. Carranza and reports that her papilledema is improving, pending note.

## 2024-05-28 NOTE — ASSESSMENT
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  I reassured her that this is not dangerous, and we almost never have to treat both sides.  Will switch her ASA to [place] to see if it helps with her stomach upset, although since it is only present when she eats certain foods, it is unlikely to be related to medications.     Plan: Continue close follow up with Dr. Carranza Switch ASA to [place] 325mg Daily MRV Head w/wo in 7/2024 Follow Up Visit with Me After Imaging

## 2024-05-28 NOTE — REASON FOR VISIT
[Home] : at home, [unfilled] , at the time of the visit. [Medical Office: ([facility])___] : at the medical office located in  [Verbal consent obtained from patient] : the patient, [unfilled] [Follow-Up: _____] : a [unfilled] follow-up visit

## 2024-07-18 ENCOUNTER — APPOINTMENT (OUTPATIENT)
Dept: NEUROSURGERY | Facility: CLINIC | Age: 39
End: 2024-07-18
Payer: COMMERCIAL

## 2024-07-18 DIAGNOSIS — G93.2 BENIGN INTRACRANIAL HYPERTENSION: ICD-10-CM

## 2024-07-18 PROCEDURE — 99442: CPT

## 2024-09-18 ENCOUNTER — NON-APPOINTMENT (OUTPATIENT)
Age: 39
End: 2024-09-18

## 2025-03-04 ENCOUNTER — APPOINTMENT (OUTPATIENT)
Dept: NEUROSURGERY | Facility: CLINIC | Age: 40
End: 2025-03-04
Payer: COMMERCIAL

## 2025-03-04 VITALS
SYSTOLIC BLOOD PRESSURE: 126 MMHG | WEIGHT: 165 LBS | RESPIRATION RATE: 16 BRPM | HEART RATE: 68 BPM | DIASTOLIC BLOOD PRESSURE: 75 MMHG | BODY MASS INDEX: 29.23 KG/M2 | HEIGHT: 63 IN | OXYGEN SATURATION: 98 %

## 2025-03-04 PROCEDURE — 99213 OFFICE O/P EST LOW 20 MIN: CPT

## 2025-03-04 RX ORDER — ACETAZOLAMIDE 250 MG/1
TABLET ORAL
Refills: 0 | Status: ACTIVE | COMMUNITY

## 2025-03-06 ENCOUNTER — APPOINTMENT (OUTPATIENT)
Dept: INTERNAL MEDICINE | Facility: CLINIC | Age: 40
End: 2025-03-06
Payer: COMMERCIAL

## 2025-03-06 ENCOUNTER — NON-APPOINTMENT (OUTPATIENT)
Age: 40
End: 2025-03-06

## 2025-03-06 VITALS
HEIGHT: 63 IN | SYSTOLIC BLOOD PRESSURE: 100 MMHG | WEIGHT: 165 LBS | BODY MASS INDEX: 29.23 KG/M2 | HEART RATE: 70 BPM | OXYGEN SATURATION: 99 % | DIASTOLIC BLOOD PRESSURE: 70 MMHG

## 2025-03-06 VITALS — DIASTOLIC BLOOD PRESSURE: 70 MMHG | SYSTOLIC BLOOD PRESSURE: 100 MMHG

## 2025-03-06 VITALS — SYSTOLIC BLOOD PRESSURE: 94 MMHG | DIASTOLIC BLOOD PRESSURE: 64 MMHG

## 2025-03-06 DIAGNOSIS — R42 DIZZINESS AND GIDDINESS: ICD-10-CM

## 2025-03-06 DIAGNOSIS — Z13.228 ENCOUNTER FOR SCREENING FOR OTHER METABOLIC DISORDERS: ICD-10-CM

## 2025-03-06 DIAGNOSIS — H93.A3 PULSATILE TINNITUS, BILATERAL: ICD-10-CM

## 2025-03-06 DIAGNOSIS — Z00.00 ENCOUNTER FOR GENERAL ADULT MEDICAL EXAMINATION W/OUT ABNORMAL FINDINGS: ICD-10-CM

## 2025-03-06 DIAGNOSIS — M25.472 EFFUSION, RIGHT ANKLE: ICD-10-CM

## 2025-03-06 DIAGNOSIS — D64.9 ANEMIA, UNSPECIFIED: ICD-10-CM

## 2025-03-06 DIAGNOSIS — M51.26 OTHER INTERVERTEBRAL DISC DISPLACEMENT, LUMBAR REGION: ICD-10-CM

## 2025-03-06 DIAGNOSIS — Z12.9 ENCOUNTER FOR SCREENING FOR MALIGNANT NEOPLASM, SITE UNSPECIFIED: ICD-10-CM

## 2025-03-06 DIAGNOSIS — G93.2 BENIGN INTRACRANIAL HYPERTENSION: ICD-10-CM

## 2025-03-06 DIAGNOSIS — M25.471 EFFUSION, RIGHT ANKLE: ICD-10-CM

## 2025-03-06 DIAGNOSIS — Z13.6 ENCOUNTER FOR SCREENING FOR CARDIOVASCULAR DISORDERS: ICD-10-CM

## 2025-03-06 DIAGNOSIS — Z71.85 ENCOUNTER FOR IMMUNIZATION SAFETY COUNSELING: ICD-10-CM

## 2025-03-06 PROCEDURE — G2211 COMPLEX E/M VISIT ADD ON: CPT | Mod: NC

## 2025-03-06 PROCEDURE — 99204 OFFICE O/P NEW MOD 45 MIN: CPT

## 2025-03-06 PROCEDURE — 93000 ELECTROCARDIOGRAM COMPLETE: CPT

## 2025-03-07 DIAGNOSIS — R82.90 UNSPECIFIED ABNORMAL FINDINGS IN URINE: ICD-10-CM

## 2025-03-07 LAB
25(OH)D3 SERPL-MCNC: 28.6 NG/ML
ALBUMIN SERPL ELPH-MCNC: 4.5 G/DL
ALP BLD-CCNC: 52 U/L
ALT SERPL-CCNC: 13 U/L
ANION GAP SERPL CALC-SCNC: 12 MMOL/L
APPEARANCE: CLEAR
AST SERPL-CCNC: 13 U/L
BACTERIA: ABNORMAL /HPF
BASOPHILS # BLD AUTO: 0.09 K/UL
BASOPHILS NFR BLD AUTO: 1.1 %
BILIRUB SERPL-MCNC: 0.2 MG/DL
BILIRUBIN URINE: NEGATIVE
BLOOD URINE: NEGATIVE
BUN SERPL-MCNC: 10 MG/DL
CALCIUM OXALATE CRYSTALS: PRESENT
CALCIUM SERPL-MCNC: 9.5 MG/DL
CAST: 1 /LPF
CHLORIDE SERPL-SCNC: 108 MMOL/L
CHOLEST SERPL-MCNC: 204 MG/DL
CK SERPL-CCNC: 69 U/L
CO2 SERPL-SCNC: 17 MMOL/L
COLOR: YELLOW
CORTISOL: 6 UG/DL
CREAT SERPL-MCNC: 0.81 MG/DL
CREAT SPEC-SCNC: 171 MG/DL
EGFRCR SERPLBLD CKD-EPI 2021: 95 ML/MIN/1.73M2
EOSINOPHIL # BLD AUTO: 0.16 K/UL
EOSINOPHIL NFR BLD AUTO: 1.9 %
EPITHELIAL CELLS: 21 /HPF
ESTIMATED AVERAGE GLUCOSE: 97 MG/DL
FERRITIN SERPL-MCNC: 8 NG/ML
FOLATE SERPL-MCNC: 12.3 NG/ML
GLUCOSE QUALITATIVE U: NEGATIVE MG/DL
GLUCOSE SERPL-MCNC: 89 MG/DL
HBA1C MFR BLD HPLC: 5 %
HCG SERPL-MCNC: <1 MIU/ML
HCT VFR BLD CALC: 36.4 %
HDLC SERPL-MCNC: 66 MG/DL
HGB BLD-MCNC: 11 G/DL
IMM GRANULOCYTES NFR BLD AUTO: 0.2 %
IRON SATN MFR SERPL: 5 %
IRON SERPL-MCNC: 19 UG/DL
KETONES URINE: NEGATIVE MG/DL
LDLC SERPL CALC-MCNC: 123 MG/DL
LEUKOCYTE ESTERASE URINE: ABNORMAL
LYMPHOCYTES # BLD AUTO: 2.28 K/UL
LYMPHOCYTES NFR BLD AUTO: 27.6 %
MAN DIFF?: NORMAL
MCHC RBC-ENTMCNC: 25.6 PG
MCHC RBC-ENTMCNC: 30.2 G/DL
MCV RBC AUTO: 84.8 FL
MICROALBUMIN 24H UR DL<=1MG/L-MCNC: <1.2 MG/DL
MICROALBUMIN/CREAT 24H UR-RTO: NORMAL MG/G
MICROSCOPIC-UA: NORMAL
MONOCYTES # BLD AUTO: 0.62 K/UL
MONOCYTES NFR BLD AUTO: 7.5 %
NEUTROPHILS # BLD AUTO: 5.09 K/UL
NEUTROPHILS NFR BLD AUTO: 61.7 %
NITRITE URINE: NEGATIVE
NONHDLC SERPL-MCNC: 138 MG/DL
NT-PROBNP SERPL-MCNC: 100 PG/ML
PH URINE: 6
PLATELET # BLD AUTO: 421 K/UL
POTASSIUM SERPL-SCNC: 4.4 MMOL/L
PROT SERPL-MCNC: 7.4 G/DL
PROTEIN URINE: NEGATIVE MG/DL
RBC # BLD: 4.29 M/UL
RBC # FLD: 17.8 %
RED BLOOD CELLS URINE: 3 /HPF
REVIEW: NORMAL
SODIUM SERPL-SCNC: 136 MMOL/L
SPECIFIC GRAVITY URINE: 1.02
T4 FREE SERPL-MCNC: 1.1 NG/DL
TIBC SERPL-MCNC: 412 UG/DL
TRIGL SERPL-MCNC: 85 MG/DL
TSH SERPL-ACNC: 2.11 UIU/ML
UIBC SERPL-MCNC: 393 UG/DL
URATE SERPL-MCNC: 3.2 MG/DL
UROBILINOGEN URINE: 0.2 MG/DL
VIT B12 SERPL-MCNC: 454 PG/ML
WBC # FLD AUTO: 8.26 K/UL
WHITE BLOOD CELLS URINE: 2 /HPF

## 2025-03-07 RX ORDER — CHLORHEXIDINE GLUCONATE 4 %
325 (65 FE) LIQUID (ML) TOPICAL
Qty: 90 | Refills: 1 | Status: ACTIVE | COMMUNITY
Start: 2025-03-07 | End: 1900-01-01

## 2025-03-09 PROBLEM — R42 DIZZINESS: Status: ACTIVE | Noted: 2025-03-06

## 2025-03-09 PROBLEM — M25.471 ANKLE EDEMA, BILATERAL: Status: RESOLVED | Noted: 2025-03-06 | Resolved: 2025-03-09

## 2025-03-09 PROBLEM — D64.9 ANEMIA, UNSPECIFIED TYPE: Status: ACTIVE | Noted: 2025-03-06

## 2025-03-10 ENCOUNTER — APPOINTMENT (OUTPATIENT)
Dept: CARDIOLOGY | Facility: CLINIC | Age: 40
End: 2025-03-10
Payer: COMMERCIAL

## 2025-03-10 PROCEDURE — 93880 EXTRACRANIAL BILAT STUDY: CPT

## 2025-03-10 PROCEDURE — 93306 TTE W/DOPPLER COMPLETE: CPT

## 2025-03-10 PROCEDURE — 93970 EXTREMITY STUDY: CPT

## 2025-03-10 RX ORDER — ACETAZOLAMIDE 250 MG/1
250 TABLET ORAL
Qty: 60 | Refills: 3 | Status: ACTIVE | COMMUNITY
Start: 2025-03-10 | End: 1900-01-01

## 2025-03-11 ENCOUNTER — NON-APPOINTMENT (OUTPATIENT)
Age: 40
End: 2025-03-11

## 2025-03-11 LAB
APPEARANCE: CLEAR
BACTERIA: NEGATIVE /HPF
BILIRUBIN URINE: NEGATIVE
BLOOD URINE: NEGATIVE
CAST: 0 /LPF
COLOR: YELLOW
EPITHELIAL CELLS: 2 /HPF
GLUCOSE QUALITATIVE U: NEGATIVE MG/DL
KETONES URINE: NEGATIVE MG/DL
LEUKOCYTE ESTERASE URINE: ABNORMAL
MICROSCOPIC-UA: NORMAL
NITRITE URINE: NEGATIVE
PH URINE: 7
PROTEIN URINE: NEGATIVE MG/DL
RED BLOOD CELLS URINE: 0 /HPF
SPECIFIC GRAVITY URINE: 1
UROBILINOGEN URINE: 0.2 MG/DL
WHITE BLOOD CELLS URINE: 1 /HPF

## 2025-03-12 DIAGNOSIS — R30.0 DYSURIA: ICD-10-CM

## 2025-03-12 LAB — BACTERIA UR CULT: NORMAL

## 2025-03-12 RX ORDER — NITROFURANTOIN (MONOHYDRATE/MACROCRYSTALS) 25; 75 MG/1; MG/1
100 CAPSULE ORAL
Qty: 10 | Refills: 0 | Status: ACTIVE | COMMUNITY
Start: 2025-03-12 | End: 1900-01-01

## 2025-03-25 ENCOUNTER — NON-APPOINTMENT (OUTPATIENT)
Age: 40
End: 2025-03-25

## 2025-03-30 PROCEDURE — 93241 XTRNL ECG REC>48HR<7D: CPT

## 2025-03-31 ENCOUNTER — NON-APPOINTMENT (OUTPATIENT)
Age: 40
End: 2025-03-31

## 2025-04-02 ENCOUNTER — LABORATORY RESULT (OUTPATIENT)
Age: 40
End: 2025-04-02

## 2025-04-02 ENCOUNTER — APPOINTMENT (OUTPATIENT)
Dept: GASTROENTEROLOGY | Facility: CLINIC | Age: 40
End: 2025-04-02
Payer: COMMERCIAL

## 2025-04-02 ENCOUNTER — APPOINTMENT (OUTPATIENT)
Dept: OTOLARYNGOLOGY | Facility: CLINIC | Age: 40
End: 2025-04-02
Payer: COMMERCIAL

## 2025-04-02 ENCOUNTER — NON-APPOINTMENT (OUTPATIENT)
Age: 40
End: 2025-04-02

## 2025-04-02 VITALS
DIASTOLIC BLOOD PRESSURE: 59 MMHG | SYSTOLIC BLOOD PRESSURE: 93 MMHG | WEIGHT: 165 LBS | TEMPERATURE: 97.8 F | HEART RATE: 68 BPM | HEIGHT: 63 IN | BODY MASS INDEX: 29.23 KG/M2

## 2025-04-02 VITALS
TEMPERATURE: 97.8 F | OXYGEN SATURATION: 99 % | HEART RATE: 68 BPM | BODY MASS INDEX: 29.23 KG/M2 | RESPIRATION RATE: 16 BRPM | SYSTOLIC BLOOD PRESSURE: 93 MMHG | DIASTOLIC BLOOD PRESSURE: 59 MMHG | WEIGHT: 165 LBS | HEIGHT: 63 IN

## 2025-04-02 DIAGNOSIS — K52.9 NONINFECTIVE GASTROENTERITIS AND COLITIS, UNSPECIFIED: ICD-10-CM

## 2025-04-02 DIAGNOSIS — H61.23 IMPACTED CERUMEN, BILATERAL: ICD-10-CM

## 2025-04-02 DIAGNOSIS — Z01.10 ENCOUNTER FOR EXAMINATION OF EARS AND HEARING W/OUT ABNORMAL FINDINGS: ICD-10-CM

## 2025-04-02 PROCEDURE — 99203 OFFICE O/P NEW LOW 30 MIN: CPT

## 2025-04-02 PROCEDURE — 92557 COMPREHENSIVE HEARING TEST: CPT

## 2025-04-02 PROCEDURE — G0268 REMOVAL OF IMPACTED WAX MD: CPT

## 2025-04-02 PROCEDURE — 92567 TYMPANOMETRY: CPT

## 2025-04-02 PROCEDURE — 99203 OFFICE O/P NEW LOW 30 MIN: CPT | Mod: 25

## 2025-04-03 ENCOUNTER — APPOINTMENT (OUTPATIENT)
Dept: CARDIOLOGY | Facility: CLINIC | Age: 40
End: 2025-04-03
Payer: COMMERCIAL

## 2025-04-03 VITALS
SYSTOLIC BLOOD PRESSURE: 120 MMHG | HEIGHT: 63 IN | WEIGHT: 169 LBS | TEMPERATURE: 98.2 F | HEART RATE: 52 BPM | DIASTOLIC BLOOD PRESSURE: 90 MMHG | BODY MASS INDEX: 29.95 KG/M2 | OXYGEN SATURATION: 99 %

## 2025-04-03 DIAGNOSIS — E78.5 HYPERLIPIDEMIA, UNSPECIFIED: ICD-10-CM

## 2025-04-03 DIAGNOSIS — R51.9 HEADACHE, UNSPECIFIED: ICD-10-CM

## 2025-04-03 DIAGNOSIS — D64.9 ANEMIA, UNSPECIFIED: ICD-10-CM

## 2025-04-03 DIAGNOSIS — R82.90 UNSPECIFIED ABNORMAL FINDINGS IN URINE: ICD-10-CM

## 2025-04-03 DIAGNOSIS — H93.A3 PULSATILE TINNITUS, BILATERAL: ICD-10-CM

## 2025-04-03 DIAGNOSIS — G93.2 BENIGN INTRACRANIAL HYPERTENSION: ICD-10-CM

## 2025-04-03 DIAGNOSIS — R42 DIZZINESS AND GIDDINESS: ICD-10-CM

## 2025-04-03 PROBLEM — R06.09 DYSPNEA ON EXERTION: Status: ACTIVE | Noted: 2025-04-03

## 2025-04-03 PROCEDURE — 93000 ELECTROCARDIOGRAM COMPLETE: CPT

## 2025-04-03 PROCEDURE — 99204 OFFICE O/P NEW MOD 45 MIN: CPT | Mod: 25

## 2025-04-04 LAB
APPEARANCE: CLEAR
BACTERIA: ABNORMAL /HPF
BASOPHILS # BLD AUTO: 0.1 K/UL
BASOPHILS NFR BLD AUTO: 1.3 %
BILIRUBIN URINE: NEGATIVE
BLOOD URINE: NEGATIVE
CAST: 0 /LPF
COLOR: YELLOW
EOSINOPHIL # BLD AUTO: 0.17 K/UL
EOSINOPHIL NFR BLD AUTO: 2.2 %
EPITHELIAL CELLS: 5 /HPF
FERRITIN SERPL-MCNC: 48 NG/ML
FOLATE SERPL-MCNC: 15.3 NG/ML
GLUCOSE QUALITATIVE U: NEGATIVE MG/DL
HCT VFR BLD CALC: 38.3 %
HGB BLD-MCNC: 11.8 G/DL
IMM GRANULOCYTES NFR BLD AUTO: 0.1 %
IRON SATN MFR SERPL: 7 %
IRON SERPL-MCNC: 23 UG/DL
KETONES URINE: NEGATIVE MG/DL
LEUKOCYTE ESTERASE URINE: ABNORMAL
LYMPHOCYTES # BLD AUTO: 2.36 K/UL
LYMPHOCYTES NFR BLD AUTO: 31 %
MAN DIFF?: NORMAL
MCHC RBC-ENTMCNC: 26.6 PG
MCHC RBC-ENTMCNC: 30.8 G/DL
MCV RBC AUTO: 86.5 FL
MICROSCOPIC-UA: NORMAL
MONOCYTES # BLD AUTO: 0.55 K/UL
MONOCYTES NFR BLD AUTO: 7.2 %
NEUTROPHILS # BLD AUTO: 4.43 K/UL
NEUTROPHILS NFR BLD AUTO: 58.2 %
NITRITE URINE: NEGATIVE
PH URINE: 5.5
PLATELET # BLD AUTO: 391 K/UL
PROTEIN URINE: NEGATIVE MG/DL
RBC # BLD: 4.43 M/UL
RBC # FLD: 19.6 %
RED BLOOD CELLS URINE: 1 /HPF
REVIEW: NORMAL
SPECIFIC GRAVITY URINE: 1.02
TIBC SERPL-MCNC: 328 UG/DL
TRANSFERRIN SERPL-MCNC: 273 MG/DL
UIBC SERPL-MCNC: 305 UG/DL
UROBILINOGEN URINE: 0.2 MG/DL
VIT B12 SERPL-MCNC: 393 PG/ML
WBC # FLD AUTO: 7.62 K/UL
WHITE BLOOD CELLS URINE: 0 /HPF

## 2025-04-06 LAB — CELIACPAN: NORMAL

## 2025-04-06 PROCEDURE — 93241 XTRNL ECG REC>48HR<7D: CPT

## 2025-04-07 ENCOUNTER — APPOINTMENT (OUTPATIENT)
Dept: CARDIOLOGY | Facility: CLINIC | Age: 40
End: 2025-04-07
Payer: COMMERCIAL

## 2025-04-07 DIAGNOSIS — R06.09 OTHER FORMS OF DYSPNEA: ICD-10-CM

## 2025-04-07 DIAGNOSIS — R94.39 ABNORMAL RESULT OF OTHER CARDIOVASCULAR FUNCTION STUDY: ICD-10-CM

## 2025-04-07 PROCEDURE — 93015 CV STRESS TEST SUPVJ I&R: CPT

## 2025-04-08 ENCOUNTER — OUTPATIENT (OUTPATIENT)
Dept: OUTPATIENT SERVICES | Facility: HOSPITAL | Age: 40
LOS: 1 days | End: 2025-04-08
Payer: COMMERCIAL

## 2025-04-08 ENCOUNTER — NON-APPOINTMENT (OUTPATIENT)
Age: 40
End: 2025-04-08

## 2025-04-08 ENCOUNTER — APPOINTMENT (OUTPATIENT)
Dept: CT IMAGING | Facility: IMAGING CENTER | Age: 40
End: 2025-04-08
Payer: COMMERCIAL

## 2025-04-08 DIAGNOSIS — R94.39 ABNORMAL RESULT OF OTHER CARDIOVASCULAR FUNCTION STUDY: ICD-10-CM

## 2025-04-08 LAB — HEMOCCULT STL QL IA: NEGATIVE

## 2025-04-08 PROCEDURE — 75574 CT ANGIO HRT W/3D IMAGE: CPT

## 2025-04-08 PROCEDURE — 75574 CT ANGIO HRT W/3D IMAGE: CPT | Mod: 26

## 2025-04-10 ENCOUNTER — NON-APPOINTMENT (OUTPATIENT)
Age: 40
End: 2025-04-10

## 2025-04-10 DIAGNOSIS — R93.89 ABNORMAL FINDINGS ON DIAGNOSTIC IMAGING OF OTHER SPECIFIED BODY STRUCTURES: ICD-10-CM

## 2025-04-10 LAB
ANION GAP SERPL CALC-SCNC: 11 MMOL/L
BACTERIA UR CULT: NORMAL
BUN SERPL-MCNC: 8 MG/DL
CALCIUM SERPL-MCNC: 9.3 MG/DL
CHLORIDE SERPL-SCNC: 108 MMOL/L
CO2 SERPL-SCNC: 20 MMOL/L
CREAT SERPL-MCNC: 0.71 MG/DL
EGFRCR SERPLBLD CKD-EPI 2021: 112 ML/MIN/1.73M2
GLUCOSE SERPL-MCNC: 83 MG/DL
H PYLORI AG STL QL: NEGATIVE
HCG SERPL-MCNC: <1 MIU/ML
POTASSIUM SERPL-SCNC: 4.2 MMOL/L
SODIUM SERPL-SCNC: 140 MMOL/L

## 2025-04-11 LAB
CALPROTECTIN FECAL: 29 UG/G
PANCREATIC ELASTASE, FECAL: 180 CD:794062645

## 2025-04-15 ENCOUNTER — NON-APPOINTMENT (OUTPATIENT)
Age: 40
End: 2025-04-15

## 2025-04-22 ENCOUNTER — APPOINTMENT (OUTPATIENT)
Dept: NEUROSURGERY | Facility: CLINIC | Age: 40
End: 2025-04-22

## 2025-04-28 ENCOUNTER — APPOINTMENT (OUTPATIENT)
Dept: NEUROSURGERY | Facility: CLINIC | Age: 40
End: 2025-04-28

## 2025-04-29 ENCOUNTER — APPOINTMENT (OUTPATIENT)
Dept: NEUROSURGERY | Facility: CLINIC | Age: 40
End: 2025-04-29

## 2025-05-05 ENCOUNTER — APPOINTMENT (OUTPATIENT)
Dept: CARDIOLOGY | Facility: CLINIC | Age: 40
End: 2025-05-05

## 2025-05-16 ENCOUNTER — APPOINTMENT (OUTPATIENT)
Dept: GASTROENTEROLOGY | Facility: CLINIC | Age: 40
End: 2025-05-16

## 2025-07-03 ENCOUNTER — APPOINTMENT (OUTPATIENT)
Dept: INTERNAL MEDICINE | Facility: CLINIC | Age: 40
End: 2025-07-03